# Patient Record
Sex: MALE | Race: WHITE | NOT HISPANIC OR LATINO | URBAN - METROPOLITAN AREA
[De-identification: names, ages, dates, MRNs, and addresses within clinical notes are randomized per-mention and may not be internally consistent; named-entity substitution may affect disease eponyms.]

---

## 2018-11-14 ENCOUNTER — OFFICE VISIT (OUTPATIENT)
Dept: URBAN - METROPOLITAN AREA CLINIC 45 | Facility: CLINIC | Age: 63
End: 2018-11-14
Payer: MEDICARE

## 2018-11-14 DIAGNOSIS — H25.13 AGE-RELATED NUCLEAR CATARACT, BILATERAL: Primary | ICD-10-CM

## 2018-11-14 PROCEDURE — 92014 COMPRE OPH EXAM EST PT 1/>: CPT | Performed by: OPTOMETRIST

## 2018-11-14 ASSESSMENT — VISUAL ACUITY
OS: 20/20
OD: 20/20

## 2018-11-14 ASSESSMENT — KERATOMETRY
OS: 37.50
OD: 37.25

## 2018-11-14 ASSESSMENT — INTRAOCULAR PRESSURE
OS: 13
OD: 15

## 2018-11-14 NOTE — IMPRESSION/PLAN
Impression: Age-related nuclear cataract, bilateral: H25.13 OU. Plan: Reassured patient of current condition and treatment. Will continue to observe condition and or symptoms.

## 2019-11-21 ENCOUNTER — OFFICE VISIT (OUTPATIENT)
Dept: URBAN - METROPOLITAN AREA CLINIC 45 | Facility: CLINIC | Age: 64
End: 2019-11-21
Payer: MEDICARE

## 2019-11-21 DIAGNOSIS — H04.123 DRY EYE SYNDROME OF BILATERAL LACRIMAL GLANDS: ICD-10-CM

## 2019-11-21 PROCEDURE — 92014 COMPRE OPH EXAM EST PT 1/>: CPT | Performed by: OPTOMETRIST

## 2019-11-21 ASSESSMENT — VISUAL ACUITY
OS: 20/25
OD: 20/30

## 2019-11-21 ASSESSMENT — INTRAOCULAR PRESSURE
OS: 13
OD: 15

## 2019-11-21 ASSESSMENT — KERATOMETRY
OS: 37.13
OD: 37.00

## 2020-11-19 ENCOUNTER — OFFICE VISIT (OUTPATIENT)
Dept: URBAN - METROPOLITAN AREA CLINIC 45 | Facility: CLINIC | Age: 65
End: 2020-11-19
Payer: MEDICARE

## 2020-11-19 DIAGNOSIS — H52.4 PRESBYOPIA: ICD-10-CM

## 2020-11-19 DIAGNOSIS — H17.89 OTHER CORNEAL SCAR: ICD-10-CM

## 2020-11-19 PROCEDURE — 92014 COMPRE OPH EXAM EST PT 1/>: CPT | Performed by: OPTOMETRIST

## 2020-11-19 ASSESSMENT — VISUAL ACUITY
OS: 20/20
OD: 20/20

## 2020-11-19 ASSESSMENT — INTRAOCULAR PRESSURE
OD: 14
OS: 12

## 2020-11-19 NOTE — IMPRESSION/PLAN
Impression: Age-related nuclear cataract, bilateral: H25.13. Plan: Discussed diagnosis in detail with patient. No treatment is required at this time. Will continue to observe condition and or symptoms. Call if [address] worsens.